# Patient Record
Sex: FEMALE
[De-identification: names, ages, dates, MRNs, and addresses within clinical notes are randomized per-mention and may not be internally consistent; named-entity substitution may affect disease eponyms.]

---

## 2018-04-06 ENCOUNTER — TRANSCRIPTION ENCOUNTER (OUTPATIENT)
Age: 63
End: 2018-04-06

## 2019-10-30 ENCOUNTER — TRANSCRIPTION ENCOUNTER (OUTPATIENT)
Age: 64
End: 2019-10-30

## 2021-01-04 ENCOUNTER — TRANSCRIPTION ENCOUNTER (OUTPATIENT)
Age: 66
End: 2021-01-04

## 2021-04-04 ENCOUNTER — TRANSCRIPTION ENCOUNTER (OUTPATIENT)
Age: 66
End: 2021-04-04

## 2022-07-22 ENCOUNTER — NON-APPOINTMENT (OUTPATIENT)
Age: 67
End: 2022-07-22

## 2022-11-18 ENCOUNTER — NON-APPOINTMENT (OUTPATIENT)
Age: 67
End: 2022-11-18

## 2022-12-15 DIAGNOSIS — Z80.0 FAMILY HISTORY OF MALIGNANT NEOPLASM OF DIGESTIVE ORGANS: ICD-10-CM

## 2022-12-15 DIAGNOSIS — Z85.3 PERSONAL HISTORY OF MALIGNANT NEOPLASM OF BREAST: ICD-10-CM

## 2022-12-15 DIAGNOSIS — I47.1 SUPRAVENTRICULAR TACHYCARDIA: ICD-10-CM

## 2022-12-15 DIAGNOSIS — Z86.39 PERSONAL HISTORY OF OTHER ENDOCRINE, NUTRITIONAL AND METABOLIC DISEASE: ICD-10-CM

## 2022-12-15 DIAGNOSIS — I10 ESSENTIAL (PRIMARY) HYPERTENSION: ICD-10-CM

## 2022-12-15 PROBLEM — Z00.00 ENCOUNTER FOR PREVENTIVE HEALTH EXAMINATION: Status: ACTIVE | Noted: 2022-12-15

## 2022-12-19 ENCOUNTER — APPOINTMENT (OUTPATIENT)
Dept: PEDIATRIC ALLERGY IMMUNOLOGY | Facility: CLINIC | Age: 67
End: 2022-12-19

## 2022-12-19 VITALS
HEIGHT: 62 IN | TEMPERATURE: 97.2 F | WEIGHT: 170 LBS | DIASTOLIC BLOOD PRESSURE: 68 MMHG | BODY MASS INDEX: 31.28 KG/M2 | OXYGEN SATURATION: 97 % | SYSTOLIC BLOOD PRESSURE: 138 MMHG | HEART RATE: 76 BPM

## 2022-12-19 DIAGNOSIS — H10.10 ACUTE ATOPIC CONJUNCTIVITIS, UNSPECIFIED EYE: ICD-10-CM

## 2022-12-19 DIAGNOSIS — J30.89 OTHER ALLERGIC RHINITIS: ICD-10-CM

## 2022-12-19 DIAGNOSIS — J45.20 MILD INTERMITTENT ASTHMA, UNCOMPLICATED: ICD-10-CM

## 2022-12-19 DIAGNOSIS — J30.1 ALLERGIC RHINITIS DUE TO POLLEN: ICD-10-CM

## 2022-12-19 DIAGNOSIS — H65.91 UNSPECIFIED NONSUPPURATIVE OTITIS MEDIA, RIGHT EAR: ICD-10-CM

## 2022-12-19 PROCEDURE — 99214 OFFICE O/P EST MOD 30 MIN: CPT

## 2022-12-19 RX ORDER — ALBUTEROL SULFATE 90 UG/1
108 (90 BASE) AEROSOL, METERED RESPIRATORY (INHALATION)
Qty: 1 | Refills: 5 | Status: ACTIVE | COMMUNITY
Start: 2022-12-19 | End: 1900-01-01

## 2022-12-19 RX ORDER — FLUTICASONE PROPIONATE 50 UG/1
50 SPRAY, METERED NASAL
Qty: 1 | Refills: 5 | Status: ACTIVE | COMMUNITY
Start: 2022-12-19 | End: 1900-01-01

## 2022-12-19 RX ORDER — FLUTICASONE PROPIONATE 110 UG/1
110 AEROSOL, METERED RESPIRATORY (INHALATION) TWICE DAILY
Qty: 1 | Refills: 5 | Status: ACTIVE | COMMUNITY
Start: 2022-12-19 | End: 1900-01-01

## 2022-12-19 NOTE — PHYSICAL EXAM
[Alert] : alert [No Acute Distress] : no acute distress [Normal Voice/Communication] : normal voice communication [Supple] : the neck was supple [Soft] : abdomen soft [Normal Cervical Lymph Nodes] : cervical [No clubbing] : no clubbing [No Cyanosis] : no cyanosis [Alert, Awake, Oriented as Age-Appropriate] : alert, awake, oriented as age appropriate [de-identified] : Eyes clear. [de-identified] : Throat clear, no thrush.  Nasal mucosa pink, no stuffy nose, no discharge.  No sinus tenderness.  Tympanic membranes dull on the right and normal on the left. [de-identified] : Chest clear, no wheezing or crackles, good air entry. [de-identified] : S1-S2 regular, no murmurs.

## 2022-12-19 NOTE — REASON FOR VISIT
[Routine Follow-Up] : a routine follow-up visit for [Allergic Rhinitis] : allergic rhinitis [Allergic Conjunctivitis] : allergic conjunctivitis [Asthma] : asthma

## 2022-12-19 NOTE — REVIEW OF SYSTEMS
[Eye Itching] : itchy eyes [Difficulty Breathing] : dyspnea [Cough] : cough [Nasal Congestion] : no nasal congestion [Recurrent Sinus Infections] : no recurrent sinus infections [Recurrent Throat Infections] : no recurrence of throat infections [Recurrent Bronchitis] : no recurrent bronchitis [Recurrent Ear Infections] : no recurrence or ear infections [Recurrent Skin Infections] : no recurrent skin infections [Recurrent Pneumonia] : no ~T recurrent pneumonia

## 2022-12-19 NOTE — SOCIAL HISTORY
[FreeTextEntry1] : Never smoked. [de-identified] : House with no carpet in the bedroom, no dust mite covers, room air conditioning, baseboard heat, no pets.

## 2022-12-19 NOTE — HISTORY OF PRESENT ILLNESS
[de-identified] : Comes for follow-up due to recent increase in chest symptoms.\john Followed in the office for allergic rhinitis, allergic conjunctivitis and asthma since May 2012.  Received immunotherapy between September 2012 and May 2018, with extracts of trees, weeds, dust mites, and molds, with good response, decreasing ocular and nasal symptoms.  Asthma usually mild intermittent, requiring rare use of albuterol, managed with maintenance treatment at times especially in the allergy season.  Latest spirometry on April 2018 was normal.\john Lives in the same house with extended family including her , her daughter, and 4 grand children, ages between 15 and 3 years old.  The grandchildren are in school and have frequent respiratory infections lately.  Her 3-year-old granddaughter had infection with RSV.  Her  became infected with RSV and required hospitalization due to low oxygen saturation (he had bone marrow transplant several years ago).  Patient developed RSV infection also 3 to 4 weeks ago, with significant cough and some shortness of breath, requiring the use of albuterol every 4 hours.  Albuterol provided instant relief, but symptoms increased again after few hours.  Now she feels better, she has some mild residual cough.  Prior to the RSV infection, she did not need albuterol in a very long time.  She had no antibiotics for bronchitis or pneumonia.\john Allergies have minimal symptoms, with occasional ocular tearing and periocular itching, especially in the spring.  She takes Zyrtec 10 mg daily all year-round now.  When she tried to stop it, after 3 to 4 days she had itching on her palms, which made her resume the Zyrtec.\john Does not require frequent antibiotic for sinus infections.  Since the RSV infection, the right ear feels clogged.\par Other medications: Aspirin 81 mg, levothyroxine 50 mcg, metoprolol 25 mg twice daily, simvastatin 20 mg, losartan 50 mg, vitamin D 2000 units.

## 2022-12-19 NOTE — ASSESSMENT
[FreeTextEntry1] : Asthma mild intermittent, recent increase in symptoms due to RSV infection: Albuterol 2 puffs every 4 hours as needed along with Flovent 110 2 puffs every 4 hours.  Explained the need to use albuterol along with an inhaled corticosteroid, when maintenance treatment for asthma is not taken.  Spirometry could not be performed today due to lack of equipment.\par Otitis media with effusion on the right, post RSV: Start Flonase 1 spray to each nostril twice daily.\par Allergic rhinitis/allergic conjunctivitis (pollen, dust mites, mold): Zyrtec 10 mg daily in the spring and Flonase as needed.  Try to taper Zyrtec in the winter, as follows (to prevent pruritus when the Zyrtec is stopped): Take it every other day for 1 week, every third day for 1 week, then use it as needed.\par Follow-up yearly.

## 2022-12-22 ENCOUNTER — NON-APPOINTMENT (OUTPATIENT)
Age: 67
End: 2022-12-22

## 2022-12-26 ENCOUNTER — NON-APPOINTMENT (OUTPATIENT)
Age: 67
End: 2022-12-26

## 2022-12-29 ENCOUNTER — NON-APPOINTMENT (OUTPATIENT)
Age: 67
End: 2022-12-29

## 2022-12-29 RX ORDER — LEVALBUTEROL TARTRATE 45 UG/1
45 AEROSOL, METERED ORAL
Qty: 1 | Refills: 5 | Status: ACTIVE | COMMUNITY
Start: 2022-12-29 | End: 1900-01-01

## 2024-01-01 ENCOUNTER — NON-APPOINTMENT (OUTPATIENT)
Age: 69
End: 2024-01-01

## 2025-03-25 ENCOUNTER — APPOINTMENT (OUTPATIENT)
Dept: PEDIATRIC ALLERGY IMMUNOLOGY | Facility: CLINIC | Age: 70
End: 2025-03-25
Payer: MEDICARE

## 2025-03-25 ENCOUNTER — RX CHANGE (OUTPATIENT)
Age: 70
End: 2025-03-25

## 2025-03-25 ENCOUNTER — NON-APPOINTMENT (OUTPATIENT)
Age: 70
End: 2025-03-25

## 2025-03-25 VITALS
TEMPERATURE: 98.1 F | OXYGEN SATURATION: 97 % | DIASTOLIC BLOOD PRESSURE: 70 MMHG | HEART RATE: 82 BPM | SYSTOLIC BLOOD PRESSURE: 160 MMHG

## 2025-03-25 DIAGNOSIS — J30.89 OTHER ALLERGIC RHINITIS: ICD-10-CM

## 2025-03-25 DIAGNOSIS — J30.1 ALLERGIC RHINITIS DUE TO POLLEN: ICD-10-CM

## 2025-03-25 DIAGNOSIS — H10.10 ACUTE ATOPIC CONJUNCTIVITIS, UNSPECIFIED EYE: ICD-10-CM

## 2025-03-25 DIAGNOSIS — J45.30 MILD PERSISTENT ASTHMA, UNCOMPLICATED: ICD-10-CM

## 2025-03-25 PROCEDURE — 99214 OFFICE O/P EST MOD 30 MIN: CPT | Mod: 25

## 2025-03-25 PROCEDURE — 95012 NITRIC OXIDE EXP GAS DETER: CPT

## 2025-03-25 PROCEDURE — 94010 BREATHING CAPACITY TEST: CPT

## 2025-03-25 RX ORDER — OLOPATADINE HYDROCHLORIDE 2 MG/ML
0.2 SOLUTION/ DROPS OPHTHALMIC TWICE DAILY
Qty: 6 | Refills: 11 | Status: ACTIVE | COMMUNITY
Start: 2025-03-25 | End: 1900-01-01

## 2025-03-25 RX ORDER — FLUTICASONE FUROATE 200 UG/1
200 POWDER RESPIRATORY (INHALATION) DAILY
Qty: 3 | Refills: 3 | Status: ACTIVE | COMMUNITY
Start: 1900-01-01 | End: 1900-01-01

## 2025-03-25 RX ORDER — BECLOMETHASONE DIPROPIONATE HFA 80 UG/1
80 AEROSOL, METERED RESPIRATORY (INHALATION) TWICE DAILY
Qty: 3 | Refills: 3 | Status: DISCONTINUED | COMMUNITY
Start: 2025-03-25 | End: 2025-03-25

## 2025-03-25 RX ORDER — CROMOLYN SODIUM 40 MG/ML
4 SOLUTION/ DROPS OPHTHALMIC 4 TIMES DAILY
Qty: 2 | Refills: 3 | Status: ACTIVE | COMMUNITY
Start: 2025-03-25 | End: 1900-01-01

## 2025-07-27 ENCOUNTER — NON-APPOINTMENT (OUTPATIENT)
Age: 70
End: 2025-07-27